# Patient Record
Sex: FEMALE | Race: OTHER | HISPANIC OR LATINO | Employment: FULL TIME | URBAN - METROPOLITAN AREA
[De-identification: names, ages, dates, MRNs, and addresses within clinical notes are randomized per-mention and may not be internally consistent; named-entity substitution may affect disease eponyms.]

---

## 2019-01-21 ENCOUNTER — TRANSCRIBE ORDERS (OUTPATIENT)
Dept: PHYSICAL THERAPY | Facility: CLINIC | Age: 49
End: 2019-01-21

## 2019-01-21 ENCOUNTER — EVALUATION (OUTPATIENT)
Dept: PHYSICAL THERAPY | Facility: CLINIC | Age: 49
End: 2019-01-21
Payer: COMMERCIAL

## 2019-01-21 DIAGNOSIS — M54.50 LUMBAR PAIN: ICD-10-CM

## 2019-01-21 DIAGNOSIS — S39.92XD INJURY OF COCCYX, SUBSEQUENT ENCOUNTER: Primary | ICD-10-CM

## 2019-01-21 PROCEDURE — 97162 PT EVAL MOD COMPLEX 30 MIN: CPT | Performed by: PHYSICAL THERAPIST

## 2019-01-21 PROCEDURE — G8978 MOBILITY CURRENT STATUS: HCPCS | Performed by: PHYSICAL THERAPIST

## 2019-01-21 PROCEDURE — G8979 MOBILITY GOAL STATUS: HCPCS | Performed by: PHYSICAL THERAPIST

## 2019-01-21 PROCEDURE — 97035 APP MDLTY 1+ULTRASOUND EA 15: CPT | Performed by: PHYSICAL THERAPIST

## 2019-01-21 PROCEDURE — 97014 ELECTRIC STIMULATION THERAPY: CPT | Performed by: PHYSICAL THERAPIST

## 2019-01-21 NOTE — LETTER
2019    Colt Dalal MD  [address] [address]    Patient: Michelle Mcclure   YOB: 1970   Date of Visit: 2019     Encounter Diagnosis     ICD-10-CM    1  Injury of coccyx, subsequent encounter S39  92XD    2  Lumbar pain M54 5        Dear Dr Trinity Lennon:    Please review the attached Plan of Care from [facility]AL[facility] Womack's recent visit  Please verify that you agree therapy should continue by signing the attached document and sending it back to our office  If you have any questions or concerns, please don't hesitate to call  Sincerely,    Armin Vivas, PT      Referring Provider:      I certify that I have read the below Plan of Care and certify the need for these services furnished under this plan of treatment while under my care  Colt Dalal MD  [address] [facility] [id number]  VIA Facsimile: [phone]          PT Evaluation     Today's date: 2019  Patient name: Michelle Mcclure  : 1970  MRN: [id number]  Referring provider: Mamadou Henderson MD  Dx:   Encounter Diagnosis     ICD-10-CM    1  Injury of coccyx, subsequent encounter S39  92XD    2  Lumbar pain M54 5        Start Time: 1000  Stop Time: 1100  Total time in clinic (min): 60 minutes    Assessment  Assessment details: Michelle Mcclure 46 y o  female injury to coccyx region secondary to fall on stairway at her home 2019  Subsequent x-rays of sacrum and coccyx were negative for acute fracture, but did reveal an angulation of the upper coccyx, but this appeared to be old  The patient related that she injured her coccyx in a fall about 26 years ago  pain with radicular symptoms related to lumbar DDD and L5-S1 disc herniation  /92  HR 86  Ht  5'2 Wt  212  Pain is localized at coccyx region; rated  6 / 10 at worse with direct palpation  Pain increases with extended sitting and walking  No radicular symptoms reported    Lumbar ROM F=48/60 E=1520 LSB=30/50 RSB=30/50  Neuro: grossly assessed; DTR , sensation, and myotome strength intact  (-) SLR bilaterally  Posture: Increase lordosis  No sleep disturbance  Gait is painful cautious with decreased step length and gait speed  Difficulty squatting and rising after sitting  Sitting tolerance is limited; she has been using soft pillow  Donut type pillow has been suggested  Ambulation tolerance is limited to 3 minutes of continuous walking  Difficulty with stair climbing  Difficulty with car transfers  Patient continues to work at [employer]    Impression: Probable bone bruise with localized inflammation   Barriers to therapy: Obesity    Goals  STG  Reduce localized pain by 50% 2-3 weeks  Improve ambulation tolerance to 10 min of pain free  continuous walking    LTG  Resolve localized pain  Establish effective home exercise program       Plan  Plan details: 2 X per week 4 weeks  Modalities, prn  TE - core stability exercises include HEP  Gait training          Subjective Patient presents an acute injury to her coccyx secondary to fall  Objective   Pain is localized at coccyx region; rated  6 / 10 at worse with direct palpation  Pain increases with extended sitting and walking  No radicular symptoms reported  Lumbar ROM F=48/60 E=15/20 LSB=30/50 RSB=30/50        Flowsheet Rows      Most Recent Value   PT/OT G-Codes   Assessment Type  Evaluation   G code set  Mobility: Walking & Moving Around   Mobility: Walking and Moving Around Current Status ()  CK   Mobility: Walking and Moving Around Goal Status ()  [facility]

## 2019-01-21 NOTE — PROGRESS NOTES
PT Evaluation     Today's date: 2019  Patient name: Ariel Reyez  : 1970  MRN: [id number]  Referring provider: Jaspreet Malave MD  Dx:   Encounter Diagnosis     ICD-10-CM    1  Injury of coccyx, subsequent encounter S39  92XD    2  Lumbar pain M54 5        Start Time: 1000  Stop Time: 1100  Total time in clinic (min): 60 minutes    Assessment  Assessment details: Ariel Reyez 46 y o  female injury to coccyx region secondary to fall on stairway at her home 2019  Subsequent x-rays of sacrum and coccyx were negative for acute fracture, but did reveal an angulation of the upper coccyx, but this appeared to be old  The patient related that she injured her coccyx in a fall about 26 years ago  pain with radicular symptoms related to lumbar DDD and L5-S1 disc herniation  /92  HR 86  Ht  5'2 Wt  212  Pain is localized at coccyx region; rated  6 / 10 at worse with direct palpation  Pain increases with extended sitting and walking  No radicular symptoms reported  Lumbar ROM F=48/60 E=15/20 LSB=30/50 RSB=30/50  Neuro: grossly assessed; DTR , sensation, and myotome strength intact  (-) SLR bilaterally  Posture: Increase lordosis  No sleep disturbance  Gait is painful cautious with decreased step length and gait speed  Difficulty squatting and rising after sitting  Sitting tolerance is limited; she has been using soft pillow   Donut type pillow has been suggested  Ambulation tolerance is limited to 3 minutes of continuous walking  Difficulty with stair climbing  Difficulty with car transfers  Patient continues to work at [employer]    Impression: Probable bone bruise with localized inflammation   Barriers to therapy: Obesity    Goals  STG  Reduce localized pain by 50% 2-3 weeks  Improve ambulation tolerance to 10 min of pain free  continuous walking    LTG  Resolve localized pain  Establish effective home exercise program       Plan  Plan details: 2 X per week 4 weeks  Modalities, prn  TE - core stability exercises include HEP  Gait training          Subjective Patient presents an acute injury to her coccyx secondary to fall  Objective   Pain is localized at coccyx region; rated  6 / 10 at worse with direct palpation  Pain increases with extended sitting and walking  No radicular symptoms reported  Lumbar ROM F=48/60 E=15/20 LSB=30/50 RSB=30/50        Flowsheet Rows      Most Recent Value   PT/OT G-Codes   Assessment Type  Evaluation   G code set  Mobility: Walking & Moving Around   Mobility: Walking and Moving Around Current Status ()  CK   Mobility: Walking and Moving Around Goal Status ()  [address]

## 2019-01-23 ENCOUNTER — OFFICE VISIT (OUTPATIENT)
Dept: PHYSICAL THERAPY | Facility: CLINIC | Age: 49
End: 2019-01-23
Payer: COMMERCIAL

## 2019-01-23 DIAGNOSIS — M54.50 LUMBAR PAIN: ICD-10-CM

## 2019-01-23 DIAGNOSIS — S39.92XD INJURY OF COCCYX, SUBSEQUENT ENCOUNTER: Primary | ICD-10-CM

## 2019-01-23 PROCEDURE — 97035 APP MDLTY 1+ULTRASOUND EA 15: CPT

## 2019-01-23 PROCEDURE — 97014 ELECTRIC STIMULATION THERAPY: CPT

## 2019-01-23 NOTE — PROGRESS NOTES
Daily Note     Today's date: 2019  Patient name: Geoffrey Murrell  : 1970  MRN: [id number]  Referring provider: Camilla Esparza MD  Dx:   Encounter Diagnosis     ICD-10-CM    1  Injury of coccyx, subsequent encounter S39  92XD    2  Lumbar pain M54 5        Start Time: 1030  Stop Time: 1100  Total time in clinic (min): 30 minutes    Subjective: Pt reports slightly less discomfort after tx last visit  Objective: See treatment diary below    Manual                                     Exercise Diary                                                                                             Modalities       US 15 15      Biphasic/CP 15 15                Assessment: Tolerated treatment well  Patient would benefit from continued PT      Plan: Continue per plan of care